# Patient Record
Sex: FEMALE | Race: WHITE | NOT HISPANIC OR LATINO | Employment: STUDENT | URBAN - METROPOLITAN AREA
[De-identification: names, ages, dates, MRNs, and addresses within clinical notes are randomized per-mention and may not be internally consistent; named-entity substitution may affect disease eponyms.]

---
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
## 2022-03-23 ENCOUNTER — HISTORICAL (OUTPATIENT)
Dept: ADMINISTRATIVE | Facility: HOSPITAL | Age: 22
End: 2022-03-23

## 2022-04-12 ENCOUNTER — HISTORICAL (OUTPATIENT)
Dept: ADMINISTRATIVE | Facility: HOSPITAL | Age: 22
End: 2022-04-12

## 2022-04-30 VITALS
SYSTOLIC BLOOD PRESSURE: 118 MMHG | WEIGHT: 106.25 LBS | DIASTOLIC BLOOD PRESSURE: 70 MMHG | BODY MASS INDEX: 18.82 KG/M2 | HEIGHT: 63 IN | OXYGEN SATURATION: 100 %

## 2022-07-18 ENCOUNTER — OFFICE VISIT (OUTPATIENT)
Dept: FAMILY MEDICINE | Facility: CLINIC | Age: 22
End: 2022-07-18
Payer: COMMERCIAL

## 2022-07-18 VITALS
HEART RATE: 143 BPM | DIASTOLIC BLOOD PRESSURE: 75 MMHG | SYSTOLIC BLOOD PRESSURE: 109 MMHG | BODY MASS INDEX: 19.51 KG/M2 | TEMPERATURE: 101 F | WEIGHT: 106 LBS | HEIGHT: 62 IN | RESPIRATION RATE: 20 BRPM

## 2022-07-18 DIAGNOSIS — R11.0 NAUSEA: ICD-10-CM

## 2022-07-18 DIAGNOSIS — J32.9 SINUSITIS, UNSPECIFIED CHRONICITY, UNSPECIFIED LOCATION: ICD-10-CM

## 2022-07-18 DIAGNOSIS — R50.9 FEVER, UNSPECIFIED FEVER CAUSE: ICD-10-CM

## 2022-07-18 LAB
CTP QC/QA: YES
SARS-COV-2 AG RESP QL IA.RAPID: NEGATIVE

## 2022-07-18 PROCEDURE — 99213 OFFICE O/P EST LOW 20 MIN: CPT | Mod: ,,, | Performed by: NURSE PRACTITIONER

## 2022-07-18 PROCEDURE — 99213 PR OFFICE/OUTPT VISIT, EST, LEVL III, 20-29 MIN: ICD-10-PCS | Mod: ,,, | Performed by: NURSE PRACTITIONER

## 2022-07-18 PROCEDURE — 87811 SARS CORONAVIRUS 2 ANTIGEN POCT, MANUAL READ: ICD-10-PCS | Mod: QW,,, | Performed by: NURSE PRACTITIONER

## 2022-07-18 PROCEDURE — 87811 SARS-COV-2 COVID19 W/OPTIC: CPT | Mod: QW,,, | Performed by: NURSE PRACTITIONER

## 2022-07-18 RX ORDER — ONDANSETRON 4 MG/1
4 TABLET, ORALLY DISINTEGRATING ORAL EVERY 6 HOURS PRN
Qty: 30 TABLET | Refills: 0 | Status: SHIPPED | OUTPATIENT
Start: 2022-07-18 | End: 2023-12-13

## 2022-07-18 RX ORDER — METHYLPREDNISOLONE 4 MG/1
TABLET ORAL
Qty: 21 EACH | Refills: 0 | Status: SHIPPED | OUTPATIENT
Start: 2022-07-18 | End: 2022-08-08

## 2022-07-18 RX ORDER — MONTELUKAST SODIUM 10 MG/1
10 TABLET ORAL NIGHTLY
Qty: 30 TABLET | Refills: 0 | Status: SHIPPED | OUTPATIENT
Start: 2022-07-18 | End: 2022-08-17

## 2022-07-18 NOTE — PROGRESS NOTES
It Subjective:       Patient ID: Mireya Sharma is a 22 y.o. female.    Chief Complaint: Fever, Sore Throat, Headache, Nasal Congestion, Nausea, and Generalized Body Aches    The patient has been having problems with fever,  sore throat, headache, nausea, and  generalized body aches for 1 day.      COVID-19 swab-negative    Review of Systems      Objective:      Physical Exam    Assessment:       Problem List Items Addressed This Visit        ENT    Sinusitis    Relevant Medications    ondansetron (ZOFRAN-ODT) 4 MG TbDL    methylPREDNISolone (MEDROL DOSEPACK) 4 mg tablet    montelukast (SINGULAIR) 10 mg tablet       GI    Nausea    Relevant Medications    ondansetron (ZOFRAN-ODT) 4 MG TbDL    methylPREDNISolone (MEDROL DOSEPACK) 4 mg tablet    montelukast (SINGULAIR) 10 mg tablet       Other    Fever    Relevant Medications    ondansetron (ZOFRAN-ODT) 4 MG TbDL    methylPREDNISolone (MEDROL DOSEPACK) 4 mg tablet    montelukast (SINGULAIR) 10 mg tablet    Other Relevant Orders    SARS Coronavirus 2 Antigen, POCT Manual Read (Completed)          Plan:       I am treating this patient for viral sinusitis.  She will be tested again a couple of days if she test positive for COVID we may alter her planned treatment.

## 2022-09-22 ENCOUNTER — HISTORICAL (OUTPATIENT)
Dept: ADMINISTRATIVE | Facility: HOSPITAL | Age: 22
End: 2022-09-22
Payer: COMMERCIAL

## 2023-07-03 NOTE — PROGRESS NOTES
Chief Complaint:     Chief Complaint   Patient presents with    Contraception         HPI:   23 y.o.  presents to discuss contraceptive options. Pt was previously on Depo Provera injections.       Graduated from [employer], going to [employer] [facility] [employer].     LMP: 7/3/23  Frequency: a month     Cycle Length: 7 days   Flow: heavy  Intermenstrual Bleeding: No  Postcoital Bleeding: No  Dysmenorrhea: No  Sexually Active: Yes    Dyspareunia: No  Contraception: None   H/o STI: No   Last pap: n/a  H/o Abnormal Pap: No   Colposcopy: no  Gardasil: 1/3   MMG: n/a        Current Outpatient Medications:     ondansetron (ZOFRAN-ODT) 4 MG TbDL, Take 1 tablet (4 mg total) by mouth every 6 (six) hours as needed (nausea). (Patient not taking: Reported on 2023), Disp: 30 tablet, Rfl: 0    Review of patient's allergies indicates:  No Known Allergies    Social History     Tobacco Use    Smoking status: Never    Smokeless tobacco: Never   Substance Use Topics    Alcohol use: Yes     Alcohol/week: 1.0 standard drink     Types: 1 Drinks containing 0.5 oz of alcohol per week    Drug use: Never       Review of Systems   Constitutional:  Negative for appetite change, chills, fatigue, fever and unexpected weight change.   Gastrointestinal:  Negative for abdominal pain, blood in stool, constipation, diarrhea, nausea, vomiting and reflux.   Genitourinary:  Negative for bladder incontinence, decreased libido, dysmenorrhea, dyspareunia, dysuria, flank pain, frequency, genital sores, hematuria, hot flashes, menorrhagia, menstrual problem, pelvic pain, urgency, vaginal bleeding, vaginal discharge, vaginal pain, urinary incontinence, postcoital bleeding, postmenopausal bleeding, vaginal dryness and vaginal odor.   Integumentary:  Negative for rash, acne, hair changes and mole/lesion.   Neurological:  Negative for headaches.        Physical Exam:   Vitals:    23 0933   BP: 116/62   BP Location: Right arm   Temp: 97 °F (36.1 °C)   Weight:  "51.4 kg (113 lb 5.1 oz)   Height: 5' 2" (1.575 m)       Body mass index is 20.73 kg/m².    Physical Exam  Constitutional:       Appearance: She is well-developed.   Abdominal:      General: Abdomen is flat.   Neurological:      Mental Status: She is alert and oriented to person, place, and time.   Psychiatric:         Attention and Perception: Attention normal.         Mood and Affect: Mood normal. Mood is not anxious or depressed.         Assessment:     Patient Active Problem List   Diagnosis    Fever    Sinusitis    Nausea       Health Maintenance Due   Topic Date Due    Hepatitis C Screening  Never done    Lipid Panel  Never done    COVID-19 Vaccine (1) Never done    HPV Vaccines (2 - 2-dose series) 01/30/2014    HIV Screening  Never done    Chlamydia Screening  Never done    Pap Smear  07/04/2023     Health Maintenance Topics with due status: Not Due       Topic Last Completion Date    TETANUS VACCINE 07/30/2013    Influenza Vaccine Not Due           Plan:    Mireya was seen today for contraception.    Diagnoses and all orders for this visit:    Encounter for contraceptive management, unspecified type  -     POCT Urine Pregnancy (Negative)  - Explained common options for contraception including natural family planning, barrier methods, depo-provera, ocps,  patch, nuvaring, IUD, Nexplanon, and sterilization.     - Discussed that birth control, such as pills, Nuva Ring , Patch or Depo Provera, Nexplanon, IUDs do not protect against STDs.     Depo-Provera contraceptive status    - DMPA injection given by nurse, tolerated well    - Desires to cont DMPA at this time    - Cont DMPA q 12 weeks    - RTC 3 months for DMPA injection between 09/20/2023 and 10/03/2023.              "

## 2023-07-05 ENCOUNTER — OFFICE VISIT (OUTPATIENT)
Dept: OBSTETRICS AND GYNECOLOGY | Facility: CLINIC | Age: 23
End: 2023-07-05
Payer: COMMERCIAL

## 2023-07-05 VITALS
BODY MASS INDEX: 20.85 KG/M2 | SYSTOLIC BLOOD PRESSURE: 116 MMHG | TEMPERATURE: 97 F | DIASTOLIC BLOOD PRESSURE: 62 MMHG | HEIGHT: 62 IN | WEIGHT: 113.31 LBS

## 2023-07-05 DIAGNOSIS — Z30.42 DEPO-PROVERA CONTRACEPTIVE STATUS: ICD-10-CM

## 2023-07-05 DIAGNOSIS — Z30.9 ENCOUNTER FOR CONTRACEPTIVE MANAGEMENT, UNSPECIFIED TYPE: Primary | ICD-10-CM

## 2023-07-05 LAB
B-HCG UR QL: NEGATIVE
CTP QC/QA: YES

## 2023-07-05 PROCEDURE — 3008F BODY MASS INDEX DOCD: CPT | Mod: CPTII,,, | Performed by: NURSE PRACTITIONER

## 2023-07-05 PROCEDURE — 3078F PR MOST RECENT DIASTOLIC BLOOD PRESSURE < 80 MM HG: ICD-10-PCS | Mod: CPTII,,, | Performed by: NURSE PRACTITIONER

## 2023-07-05 PROCEDURE — 3074F SYST BP LT 130 MM HG: CPT | Mod: CPTII,,, | Performed by: NURSE PRACTITIONER

## 2023-07-05 PROCEDURE — 96372 PR INJECTION,THERAP/PROPH/DIAG2ST, IM OR SUBCUT: ICD-10-PCS | Mod: ,,, | Performed by: NURSE PRACTITIONER

## 2023-07-05 PROCEDURE — 3074F PR MOST RECENT SYSTOLIC BLOOD PRESSURE < 130 MM HG: ICD-10-PCS | Mod: CPTII,,, | Performed by: NURSE PRACTITIONER

## 2023-07-05 PROCEDURE — 1159F PR MEDICATION LIST DOCUMENTED IN MEDICAL RECORD: ICD-10-PCS | Mod: CPTII,,, | Performed by: NURSE PRACTITIONER

## 2023-07-05 PROCEDURE — 1159F MED LIST DOCD IN RCRD: CPT | Mod: CPTII,,, | Performed by: NURSE PRACTITIONER

## 2023-07-05 PROCEDURE — 96372 THER/PROPH/DIAG INJ SC/IM: CPT | Mod: ,,, | Performed by: NURSE PRACTITIONER

## 2023-07-05 PROCEDURE — 1160F PR REVIEW ALL MEDS BY PRESCRIBER/CLIN PHARMACIST DOCUMENTED: ICD-10-PCS | Mod: CPTII,,, | Performed by: NURSE PRACTITIONER

## 2023-07-05 PROCEDURE — 1160F RVW MEDS BY RX/DR IN RCRD: CPT | Mod: CPTII,,, | Performed by: NURSE PRACTITIONER

## 2023-07-05 PROCEDURE — 3078F DIAST BP <80 MM HG: CPT | Mod: CPTII,,, | Performed by: NURSE PRACTITIONER

## 2023-07-05 PROCEDURE — 3008F PR BODY MASS INDEX (BMI) DOCUMENTED: ICD-10-PCS | Mod: CPTII,,, | Performed by: NURSE PRACTITIONER

## 2023-07-05 PROCEDURE — 99213 OFFICE O/P EST LOW 20 MIN: CPT | Mod: 25,,, | Performed by: NURSE PRACTITIONER

## 2023-07-05 PROCEDURE — 81025 POCT URINE PREGNANCY: ICD-10-PCS | Mod: ,,, | Performed by: NURSE PRACTITIONER

## 2023-07-05 PROCEDURE — 81025 URINE PREGNANCY TEST: CPT | Mod: ,,, | Performed by: NURSE PRACTITIONER

## 2023-07-05 PROCEDURE — 99213 PR OFFICE/OUTPT VISIT, EST, LEVL III, 20-29 MIN: ICD-10-PCS | Mod: 25,,, | Performed by: NURSE PRACTITIONER

## 2023-07-05 RX ORDER — MEDROXYPROGESTERONE ACETATE 150 MG/ML
150 INJECTION, SUSPENSION INTRAMUSCULAR
Status: COMPLETED | OUTPATIENT
Start: 2023-07-05 | End: 2023-07-05

## 2023-07-05 RX ADMIN — MEDROXYPROGESTERONE ACETATE 150 MG: 150 INJECTION, SUSPENSION INTRAMUSCULAR at 09:07

## 2023-09-26 NOTE — PROGRESS NOTES
Chief Complaint     Contraception    HPI:     Patient is a 23 y.o.  presents today to discuss contraception. Reports hormonal mood swings with depo provera, desires  change to pills. Reports bleeding only when shot due.    LMP: 23  Frequency: a month     Cycle Length: 7 days   Flow: heavy  Intermenstrual Bleeding: No  Postcoital Bleeding: No  Dysmenorrhea: yes  Sexually Active: Yes    Dyspareunia: No  Contraception: None   H/o STI: No   Last pap: n/a  H/o Abnormal Pap: No   Colposcopy: no  Gardasil: 1/3   MMG: n/    History reviewed. No pertinent past medical history.    History reviewed. No pertinent surgical history.    Family History   Problem Relation Age of Onset    Breast cancer Neg Hx     Cancer Neg Hx     Colon cancer Neg Hx     Uterine cancer Neg Hx     Cervical cancer Neg Hx        OB History          0    Para   0    Term   0       0    AB   0    Living   0         SAB   0    IAB   0    Ectopic   0    Multiple   0    Live Births   0                 Current Outpatient Medications on File Prior to Visit   Medication Sig Dispense Refill    ondansetron (ZOFRAN-ODT) 4 MG TbDL Take 1 tablet (4 mg total) by mouth every 6 (six) hours as needed (nausea). (Patient not taking: Reported on 2023) 30 tablet 0     No current facility-administered medications on file prior to visit.       Review of Systems:       Review of Systems   Constitutional:  Negative for chills and fever.   Gastrointestinal:  Negative for abdominal pain, constipation and diarrhea.   Genitourinary:  Negative for bladder incontinence, decreased libido, dysmenorrhea, dyspareunia, dysuria, flank pain, frequency, genital sores, hematuria, hot flashes, menorrhagia, menstrual problem, pelvic pain, urgency, vaginal bleeding, vaginal discharge, vaginal pain, urinary incontinence, postcoital bleeding, postmenopausal bleeding, vaginal dryness and vaginal odor.        Physical Exam:    /62 (BP Location: Right arm,  Patient Position: Sitting, BP Method: Medium (Manual))   Temp 97.5 °F (36.4 °C) (Temporal)   Wt 51.9 kg (114 lb 6.7 oz)   LMP 09/18/2023   BMI 20.93 kg/m²     Physical Exam     deferred  Assessment:   1. Encounter for contraceptive management, unspecified type  -     POCT urine pregnancy    2. Initiation of oral contraception  -     norethindrone-e.estradioL-iron (MINASTRIN 24 FE) 1 mg-20 mcg(24) /75 mg (4) Chew; Take 1 tablet by mouth once daily.  Dispense: 28 tablet; Refill: 3             Plan:   1. Encounter for contraceptive management, unspecified type  - POCT urine pregnancy    2. Initiation of oral contraception  - norethindrone-e.estradioL-iron (MINASTRIN 24 FE) 1 mg-20 mcg(24) /75 mg (4) Chew; Take 1 tablet by mouth once daily.  Dispense: 28 tablet; Refill: 3      Discussed with patient contraceptive options including natural family planning, barrier, oral contraceptives, patch, NuvaRing, Depo-Provera, Nexplanon, IUDs, abstinence.     Safe sex education given. Discussed with patient that birth control options discussed above do not protect against STDs.   Patient desires: Minastrin    UPT in clinic today: negative   RTC OCP f/u 3 months

## 2023-09-27 ENCOUNTER — OFFICE VISIT (OUTPATIENT)
Dept: OBSTETRICS AND GYNECOLOGY | Facility: CLINIC | Age: 23
End: 2023-09-27
Payer: COMMERCIAL

## 2023-09-27 VITALS
WEIGHT: 114.44 LBS | BODY MASS INDEX: 20.93 KG/M2 | TEMPERATURE: 98 F | SYSTOLIC BLOOD PRESSURE: 112 MMHG | DIASTOLIC BLOOD PRESSURE: 62 MMHG

## 2023-09-27 DIAGNOSIS — Z30.9 ENCOUNTER FOR CONTRACEPTIVE MANAGEMENT, UNSPECIFIED TYPE: Primary | ICD-10-CM

## 2023-09-27 DIAGNOSIS — Z30.011 INITIATION OF ORAL CONTRACEPTION: ICD-10-CM

## 2023-09-27 LAB
B-HCG UR QL: NEGATIVE
CTP QC/QA: YES

## 2023-09-27 PROCEDURE — 3008F BODY MASS INDEX DOCD: CPT | Mod: CPTII,,, | Performed by: NURSE PRACTITIONER

## 2023-09-27 PROCEDURE — 1160F RVW MEDS BY RX/DR IN RCRD: CPT | Mod: CPTII,,, | Performed by: NURSE PRACTITIONER

## 2023-09-27 PROCEDURE — 3078F DIAST BP <80 MM HG: CPT | Mod: CPTII,,, | Performed by: NURSE PRACTITIONER

## 2023-09-27 PROCEDURE — 3074F SYST BP LT 130 MM HG: CPT | Mod: CPTII,,, | Performed by: NURSE PRACTITIONER

## 2023-09-27 PROCEDURE — 3074F PR MOST RECENT SYSTOLIC BLOOD PRESSURE < 130 MM HG: ICD-10-PCS | Mod: CPTII,,, | Performed by: NURSE PRACTITIONER

## 2023-09-27 PROCEDURE — 99212 OFFICE O/P EST SF 10 MIN: CPT | Mod: ,,, | Performed by: NURSE PRACTITIONER

## 2023-09-27 PROCEDURE — 3008F PR BODY MASS INDEX (BMI) DOCUMENTED: ICD-10-PCS | Mod: CPTII,,, | Performed by: NURSE PRACTITIONER

## 2023-09-27 PROCEDURE — 81025 POCT URINE PREGNANCY: ICD-10-PCS | Mod: ,,, | Performed by: NURSE PRACTITIONER

## 2023-09-27 PROCEDURE — 81025 URINE PREGNANCY TEST: CPT | Mod: ,,, | Performed by: NURSE PRACTITIONER

## 2023-09-27 PROCEDURE — 99212 PR OFFICE/OUTPT VISIT, EST, LEVL II, 10-19 MIN: ICD-10-PCS | Mod: ,,, | Performed by: NURSE PRACTITIONER

## 2023-09-27 PROCEDURE — 3078F PR MOST RECENT DIASTOLIC BLOOD PRESSURE < 80 MM HG: ICD-10-PCS | Mod: CPTII,,, | Performed by: NURSE PRACTITIONER

## 2023-09-27 PROCEDURE — 1159F MED LIST DOCD IN RCRD: CPT | Mod: CPTII,,, | Performed by: NURSE PRACTITIONER

## 2023-09-27 PROCEDURE — 1159F PR MEDICATION LIST DOCUMENTED IN MEDICAL RECORD: ICD-10-PCS | Mod: CPTII,,, | Performed by: NURSE PRACTITIONER

## 2023-09-27 PROCEDURE — 1160F PR REVIEW ALL MEDS BY PRESCRIBER/CLIN PHARMACIST DOCUMENTED: ICD-10-PCS | Mod: CPTII,,, | Performed by: NURSE PRACTITIONER

## 2023-09-27 RX ORDER — NORETHINDRONE ACETATE AND ETHINYL ESTRADIOL AND FERROUS FUMARATE 1MG-20(24)
1 KIT ORAL DAILY
Qty: 28 TABLET | Refills: 3 | Status: SHIPPED | OUTPATIENT
Start: 2023-09-27 | End: 2023-10-25

## 2023-10-02 ENCOUNTER — TELEPHONE (OUTPATIENT)
Dept: OBSTETRICS AND GYNECOLOGY | Facility: CLINIC | Age: 23
End: 2023-10-02
Payer: COMMERCIAL

## 2023-10-02 NOTE — TELEPHONE ENCOUNTER
Spoke with pt and let her know to please allow up to 3 months for everything to become in order. And to keep period log to track cycle and symptoms. Pt verified understanding advised pt to keep 3 month follow up on ocp

## 2023-10-02 NOTE — TELEPHONE ENCOUNTER
----- Message from Brylee Guillory sent at 10/2/2023 10:38 AM CDT -----  Regarding: Pt Advice  Contact: Mireya  Pt called to inform us that she was prescribed norethindrone-e.estradioL-iron (MINASTRIN 24 FE) 1 mg-20 mcg(24) /75 mg (4) Chew to help her cycles. States that her cramps have become more severe and it is making her nauseous. Pt wants to know what she should do. Pt call back number [phone]

## 2023-10-23 ENCOUNTER — TELEPHONE (OUTPATIENT)
Dept: OBSTETRICS AND GYNECOLOGY | Facility: CLINIC | Age: 23
End: 2023-10-23
Payer: COMMERCIAL

## 2023-10-23 NOTE — TELEPHONE ENCOUNTER
----- Message from Irish Shankar sent at 10/23/2023  9:27 AM CDT -----  Regarding: Call Back  Type:  Patient Returning Call    Who Called:  Who Left Message for Patient:  Does the patient know what this is regarding?:  Would the patient rather a call back or a response via [facility]?   Best Call Back Number:[phone]  Additional Information: Asking for samples for birth control due to insurance change.

## 2023-10-23 NOTE — TELEPHONE ENCOUNTER
Contacted pt in regards to her message. Pt states she got it figured out and does not need birth control samples.

## 2023-11-02 ENCOUNTER — LAB VISIT (OUTPATIENT)
Dept: LAB | Facility: HOSPITAL | Age: 23
End: 2023-11-02
Attending: SPECIALIST
Payer: COMMERCIAL

## 2023-11-02 DIAGNOSIS — Z00.00 ROUTINE CHECK-UP: Primary | ICD-10-CM

## 2023-11-02 DIAGNOSIS — D64.9 ANEMIA, UNSPECIFIED TYPE: ICD-10-CM

## 2023-11-02 LAB
ALBUMIN SERPL-MCNC: 4.7 G/DL (ref 3.4–5)
ALBUMIN/GLOB SERPL: 1.4 RATIO
ALP SERPL-CCNC: 51 UNIT/L (ref 50–144)
ALT SERPL-CCNC: 21 UNIT/L (ref 1–45)
ANION GAP SERPL CALC-SCNC: 12 MEQ/L (ref 2–13)
AST SERPL-CCNC: 30 UNIT/L (ref 14–36)
BASOPHILS # BLD AUTO: 0.06 X10(3)/MCL (ref 0.01–0.08)
BASOPHILS NFR BLD AUTO: 0.8 % (ref 0.1–1.2)
BILIRUB SERPL-MCNC: 0.7 MG/DL (ref 0–1)
BUN SERPL-MCNC: 12 MG/DL (ref 7–20)
CALCIUM SERPL-MCNC: 9.2 MG/DL (ref 8.4–10.2)
CHLORIDE SERPL-SCNC: 103 MMOL/L (ref 98–110)
CO2 SERPL-SCNC: 26 MMOL/L (ref 21–32)
CREAT SERPL-MCNC: 1.08 MG/DL (ref 0.66–1.25)
CREAT/UREA NIT SERPL: 11 (ref 12–20)
EOSINOPHIL # BLD AUTO: 0.14 X10(3)/MCL (ref 0.04–0.36)
EOSINOPHIL NFR BLD AUTO: 1.8 % (ref 0.7–7)
ERYTHROCYTE [DISTWIDTH] IN BLOOD BY AUTOMATED COUNT: 12.1 % (ref 11–14.5)
GFR SERPLBLD CREATININE-BSD FMLA CKD-EPI: 75 MLS/MIN/1.73/M2
GLOBULIN SER-MCNC: 3.3 GM/DL (ref 2–3.9)
GLUCOSE SERPL-MCNC: 94 MG/DL (ref 70–115)
HCT VFR BLD AUTO: 42.4 % (ref 36–48)
HGB BLD-MCNC: 14.5 G/DL (ref 11.8–16)
IMM GRANULOCYTES # BLD AUTO: 0.01 X10(3)/MCL (ref 0–0.03)
IMM GRANULOCYTES NFR BLD AUTO: 0.1 % (ref 0–0.5)
LYMPHOCYTES # BLD AUTO: 2.46 X10(3)/MCL (ref 1.16–3.74)
LYMPHOCYTES NFR BLD AUTO: 32 % (ref 20–55)
MCH RBC QN AUTO: 29.8 PG (ref 27–34)
MCHC RBC AUTO-ENTMCNC: 34.2 G/DL (ref 31–37)
MCV RBC AUTO: 87.1 FL (ref 79–99)
MONOCYTES # BLD AUTO: 0.55 X10(3)/MCL (ref 0.24–0.36)
MONOCYTES NFR BLD AUTO: 7.2 % (ref 4.7–12.5)
NEUTROPHILS # BLD AUTO: 4.47 X10(3)/MCL (ref 1.56–6.13)
NEUTROPHILS NFR BLD AUTO: 58.1 % (ref 37–73)
NRBC BLD AUTO-RTO: 0 %
PLATELET # BLD AUTO: 333 X10(3)/MCL (ref 140–371)
PMV BLD AUTO: 9.6 FL (ref 9.4–12.4)
POTASSIUM SERPL-SCNC: 4 MMOL/L (ref 3.5–5.1)
PROT SERPL-MCNC: 8 GM/DL (ref 6.3–8.2)
RBC # BLD AUTO: 4.87 X10(6)/MCL (ref 4–5.1)
SODIUM SERPL-SCNC: 141 MMOL/L (ref 135–145)
T4 FREE SERPL-MCNC: 1.37 NG/DL (ref 0.78–2.19)
TSH SERPL-ACNC: 1.03 UIU/ML (ref 0.36–3.74)
WBC # SPEC AUTO: 7.69 X10(3)/MCL (ref 4–11.5)

## 2023-11-02 PROCEDURE — 85025 COMPLETE CBC W/AUTO DIFF WBC: CPT

## 2023-11-02 PROCEDURE — 84439 ASSAY OF FREE THYROXINE: CPT

## 2023-11-02 PROCEDURE — 36415 COLL VENOUS BLD VENIPUNCTURE: CPT

## 2023-11-02 PROCEDURE — 80053 COMPREHEN METABOLIC PANEL: CPT

## 2023-11-02 PROCEDURE — 84443 ASSAY THYROID STIM HORMONE: CPT

## 2023-12-12 NOTE — PROGRESS NOTES
Chief Complaint: Annual exam    Chief Complaint   Patient presents with    Well Woman       HPI:   Mireya Sharma is a 23 y.o. year old  here for her Annual Exam. Currently on Minastrin. Reports skipping cycle by taking contraception continuously. Reports 10 lbs weight gain, acne since starting OCP. States has changed diet and exercise and has lost weight. Desires to continue current OCP.     Gyn hx:  LMP: 2023  Frequency: monthly     Cycle Length: 7 days   Flow: heavy  Intermenstrual Bleeding: No  Postcoital Bleeding: No  Dysmenorrhea: yes  Sexually Active: Yes    Dyspareunia: No  Contraception: None   H/o STI: No   Last pap: n/a  H/o Abnormal Pap: No   Colposcopy: no  Gardasil: 1/3   MMG: n/a  H/o abnl MMG: n/a        History reviewed. No pertinent past medical history.  History reviewed. No pertinent surgical history.    Current Outpatient Medications:     clindamycin-benzoyl peroxide (BENZACLIN) gel, Apply topically 2 (two) times daily., Disp: 25 g, Rfl: 1    norethindrone-e.estradioL-iron (MINASTRIN 24 FE) 1 mg-20 mcg(24) /75 mg (4) Chew, Take 1 tablet by mouth once daily., Disp: 28 tablet, Rfl: 11  Review of patient's allergies indicates:  No Known Allergies  OB History    Para Term  AB Living   0 0 0 0 0 0   SAB IAB Ectopic Multiple Live Births   0 0 0 0 0     Social History     Tobacco Use    Smoking status: Never     Passive exposure: Never    Smokeless tobacco: Never   Substance Use Topics    Alcohol use: Yes     Alcohol/week: 1.0 standard drink of alcohol     Types: 1 Drinks containing 0.5 oz of alcohol per week    Drug use: Never     Family History   Problem Relation Age of Onset    Breast cancer Neg Hx     Cancer Neg Hx     Colon cancer Neg Hx     Uterine cancer Neg Hx     Cervical cancer Neg Hx     Ovarian cancer Neg Hx        Review of Systems:   Review of Systems   Constitutional:  Negative for appetite change, chills, fatigue, fever and unexpected weight change.   Eyes:   "Negative for visual disturbance.   Respiratory:  Negative for cough, shortness of breath and wheezing.    Cardiovascular:  Negative for chest pain, palpitations and leg swelling.   Gastrointestinal:  Negative for abdominal pain, bloating, blood in stool, constipation, diarrhea, nausea, vomiting, reflux and fecal incontinence.   Endocrine: Negative for hair loss and hot flashes.   Genitourinary:  Negative for bladder incontinence, decreased libido, dysmenorrhea, dyspareunia, dysuria, flank pain, frequency, genital sores, hematuria, hot flashes, menorrhagia, menstrual problem, pelvic pain, urgency, vaginal bleeding, vaginal discharge, vaginal pain, urinary incontinence, postcoital bleeding, postmenopausal bleeding, vaginal dryness and vaginal odor.   Integumentary:  Positive for acne. Negative for rash, hair changes, breast mass, nipple discharge, breast skin changes and breast tenderness.   Neurological:  Negative for headaches.   Psychiatric/Behavioral:  Negative for depression.    Breast: Negative for asymmetry, breast self exam, lump, mass, mastodynia, nipple discharge, skin changes and tenderness       Physical Exam:  /70 (BP Location: Left arm, Patient Position: Sitting, BP Method: Medium (Manual))   Temp 98.2 °F (36.8 °C) (Temporal)   Ht 5' 3" (1.6 m)   Wt 52.3 kg (115 lb 6.4 oz)   LMP 09/18/2023 (Exact Date)   BMI 20.44 kg/m²       Physical Exam:   Constitutional: She is oriented to person, place, and time. She appears well-developed and well-nourished.    HENT:   Head: Normocephalic.      Cardiovascular:       Exam reveals no edema.        Pulmonary/Chest: Effort normal. She exhibits no mass, no tenderness, no bony tenderness, no deformity and no retraction. Right breast exhibits no inverted nipple, no mass, no nipple discharge, no skin change, no tenderness, no bleeding, no swelling, no mastectomy, no augmentation and no lumpectomy. Left breast exhibits no inverted nipple, no mass, no nipple " discharge, no skin change, no tenderness, no bleeding, no swelling, no mastectomy, no augmentation and no lumpectomy. Breasts are symmetrical.        Abdominal: Soft. She exhibits no distension and no mass. There is no abdominal tenderness. There is no rebound and no guarding. No hernia. Hernia confirmed negative in the right inguinal area.     Genitourinary:    Inguinal canal, vagina, uterus, right adnexa, left adnexa and rectum normal.   The external female genitalia was normal.   No external genitalia lesions identified,Genitalia hair distrobution normal .     Labial bartholins normal.There is no rash, tenderness, lesion or injury on the right labia. There is no rash, tenderness, lesion or injury on the left labia.   Rectum:      No anal fissure or external hemorrhoid.   Vagina exhibits no lesion. No erythema, vaginal discharge, tenderness, bleeding, rectocele, cystocele or prolapse of vaginal walls in the vagina.    No foreign body in the vagina.      No signs of injury in the vagina.   Vagina was moist.No no masses or organomegaly. Right adnexum displays no mass, no tenderness and no fullness. Left adnexum displays no mass, no tenderness and no fullness. Cervix is normal. Cervix exhibits no motion tenderness, no lesion, no discharge, no friability, no lesion, no tenderness and no polyp. Uterus consistancy normal and Uerus contour normal  Uterus is not deviated, not enlarged, not fixed, not tender, not hosting fibroids and no uterine prolapse. Normal urethral meatus.Urethral Meatus exhibits: urethral lesionUrethra findings: no urethral mass, no tenderness and prolapsedBladder findings: no bladder tendernessBreasts:     Breasts are symmetrical.      Right: No inverted nipple, mass, nipple discharge, skin change or tenderness.      Left: No inverted nipple, mass, nipple discharge, skin change or tenderness.             Musculoskeletal: Normal range of motion.      Lymphadenopathy: No inguinal adenopathy noted on  the right or left side.    Neurological: She is alert and oriented to person, place, and time.    Skin: Skin is warm and dry.    Psychiatric: She has a normal mood and affect. Her behavior is normal. Judgment and thought content normal.        Assessment:   Annual Well Women Exam  1. Encounter for gynecological examination with abnormal finding    2. Oral contraceptive use  - norethindrone-e.estradioL-iron (MINASTRIN 24 FE) 1 mg-20 mcg(24) /75 mg (4) Chew; Take 1 tablet by mouth once daily.  Dispense: 28 tablet; Refill: 11    3. Acne, unspecified acne type  - clindamycin-benzoyl peroxide (BENZACLIN) gel; Apply topically 2 (two) times daily.  Dispense: 25 g; Refill: 1        Plan:  Pap Done GC CZ TV   Breast Self-awareness  Recommend exercise at least 3 times weekly  Healthy, balanced diet  Keep yearly follow up with PCP  Follow up for PRN/ANNUAL .   Mireya was seen today for well woman.    Diagnoses and all orders for this visit:    Encounter for gynecological examination with abnormal finding    Oral contraceptive use  -     norethindrone-e.estradioL-iron (MINASTRIN 24 FE) 1 mg-20 mcg(24) /75 mg (4) Chew; Take 1 tablet by mouth once daily.    Acne, unspecified acne type  -     clindamycin-benzoyl peroxide (BENZACLIN) gel; Apply topically 2 (two) times daily.        Desires to continue Minastrin   Rx: Benzaclin for acne     RTC PRN/ANNUAL     Counseling:    A brief discussion of contraceptive choices and STD prevention was had.    Avoidance of cigarette smoking, alcohol use, and drug use was encouraged.    A healthy diet and regular exercise was stressed.    All questions were answered and the patient voiced understanding of the above issues.

## 2023-12-13 ENCOUNTER — OFFICE VISIT (OUTPATIENT)
Dept: OBSTETRICS AND GYNECOLOGY | Facility: CLINIC | Age: 23
End: 2023-12-13
Payer: COMMERCIAL

## 2023-12-13 VITALS
SYSTOLIC BLOOD PRESSURE: 104 MMHG | BODY MASS INDEX: 20.45 KG/M2 | TEMPERATURE: 98 F | HEIGHT: 63 IN | DIASTOLIC BLOOD PRESSURE: 70 MMHG | WEIGHT: 115.38 LBS

## 2023-12-13 DIAGNOSIS — Z01.411 ENCOUNTER FOR GYNECOLOGICAL EXAMINATION WITH ABNORMAL FINDING: Primary | ICD-10-CM

## 2023-12-13 DIAGNOSIS — L70.9 ACNE, UNSPECIFIED ACNE TYPE: ICD-10-CM

## 2023-12-13 DIAGNOSIS — Z30.41 ORAL CONTRACEPTIVE USE: ICD-10-CM

## 2023-12-13 PROCEDURE — 1159F MED LIST DOCD IN RCRD: CPT | Mod: CPTII,,, | Performed by: NURSE PRACTITIONER

## 2023-12-13 PROCEDURE — 3078F DIAST BP <80 MM HG: CPT | Mod: CPTII,,, | Performed by: NURSE PRACTITIONER

## 2023-12-13 PROCEDURE — 99395 PREV VISIT EST AGE 18-39: CPT | Mod: ,,, | Performed by: NURSE PRACTITIONER

## 2023-12-13 PROCEDURE — 1159F PR MEDICATION LIST DOCUMENTED IN MEDICAL RECORD: ICD-10-PCS | Mod: CPTII,,, | Performed by: NURSE PRACTITIONER

## 2023-12-13 PROCEDURE — 3008F PR BODY MASS INDEX (BMI) DOCUMENTED: ICD-10-PCS | Mod: CPTII,,, | Performed by: NURSE PRACTITIONER

## 2023-12-13 PROCEDURE — 3074F PR MOST RECENT SYSTOLIC BLOOD PRESSURE < 130 MM HG: ICD-10-PCS | Mod: CPTII,,, | Performed by: NURSE PRACTITIONER

## 2023-12-13 PROCEDURE — 3074F SYST BP LT 130 MM HG: CPT | Mod: CPTII,,, | Performed by: NURSE PRACTITIONER

## 2023-12-13 PROCEDURE — 3008F BODY MASS INDEX DOCD: CPT | Mod: CPTII,,, | Performed by: NURSE PRACTITIONER

## 2023-12-13 PROCEDURE — 1160F PR REVIEW ALL MEDS BY PRESCRIBER/CLIN PHARMACIST DOCUMENTED: ICD-10-PCS | Mod: CPTII,,, | Performed by: NURSE PRACTITIONER

## 2023-12-13 PROCEDURE — 99395 PR PREVENTIVE VISIT,EST,18-39: ICD-10-PCS | Mod: ,,, | Performed by: NURSE PRACTITIONER

## 2023-12-13 PROCEDURE — 3078F PR MOST RECENT DIASTOLIC BLOOD PRESSURE < 80 MM HG: ICD-10-PCS | Mod: CPTII,,, | Performed by: NURSE PRACTITIONER

## 2023-12-13 PROCEDURE — 1160F RVW MEDS BY RX/DR IN RCRD: CPT | Mod: CPTII,,, | Performed by: NURSE PRACTITIONER

## 2023-12-13 RX ORDER — NORETHINDRONE ACETATE AND ETHINYL ESTRADIOL AND FERROUS FUMARATE 1MG-20(24)
1 KIT ORAL
COMMUNITY
Start: 2023-12-08 | End: 2023-12-13 | Stop reason: SDUPTHER

## 2023-12-13 RX ORDER — CLINDAMYCIN AND BENZOYL PEROXIDE 10; 50 MG/G; MG/G
GEL TOPICAL 2 TIMES DAILY
Qty: 25 G | Refills: 1 | Status: SHIPPED | OUTPATIENT
Start: 2023-12-13 | End: 2024-12-12

## 2023-12-13 RX ORDER — NORETHINDRONE ACETATE AND ETHINYL ESTRADIOL AND FERROUS FUMARATE 1MG-20(24)
1 KIT ORAL DAILY
Qty: 28 TABLET | Refills: 11 | Status: SHIPPED | OUTPATIENT
Start: 2023-12-13

## 2023-12-18 LAB — PSYCHE PATHOLOGY RESULT: NORMAL
